# Patient Record
Sex: FEMALE | Race: WHITE | NOT HISPANIC OR LATINO | Employment: FULL TIME | URBAN - METROPOLITAN AREA
[De-identification: names, ages, dates, MRNs, and addresses within clinical notes are randomized per-mention and may not be internally consistent; named-entity substitution may affect disease eponyms.]

---

## 2024-02-02 ENCOUNTER — OFFICE VISIT (OUTPATIENT)
Dept: PRIMARY CARE | Facility: CLINIC | Age: 54
End: 2024-02-02
Payer: COMMERCIAL

## 2024-02-02 VITALS
DIASTOLIC BLOOD PRESSURE: 88 MMHG | OXYGEN SATURATION: 97 % | BODY MASS INDEX: 30.43 KG/M2 | HEIGHT: 60 IN | WEIGHT: 155 LBS | HEART RATE: 90 BPM | SYSTOLIC BLOOD PRESSURE: 150 MMHG

## 2024-02-02 DIAGNOSIS — Z72.0 TOBACCO USE: ICD-10-CM

## 2024-02-02 DIAGNOSIS — R59.1 LYMPHADENOPATHY: ICD-10-CM

## 2024-02-02 DIAGNOSIS — R03.0 ELEVATED BP WITHOUT DIAGNOSIS OF HYPERTENSION: ICD-10-CM

## 2024-02-02 DIAGNOSIS — Z76.89 ENCOUNTER TO ESTABLISH CARE: Primary | ICD-10-CM

## 2024-02-02 PROCEDURE — 99203 OFFICE O/P NEW LOW 30 MIN: CPT

## 2024-02-02 ASSESSMENT — ENCOUNTER SYMPTOMS
FATIGUE: 1
SORE THROAT: 0
FEVER: 0
SHORTNESS OF BREATH: 0
WHEEZING: 0
CHILLS: 0
SINUS PAIN: 1
COUGH: 0
RHINORRHEA: 1
SINUS PRESSURE: 1

## 2024-02-02 ASSESSMENT — PATIENT HEALTH QUESTIONNAIRE - PHQ9
SUM OF ALL RESPONSES TO PHQ9 QUESTIONS 1 AND 2: 0
2. FEELING DOWN, DEPRESSED OR HOPELESS: NOT AT ALL
1. LITTLE INTEREST OR PLEASURE IN DOING THINGS: NOT AT ALL

## 2024-02-02 ASSESSMENT — PAIN SCALES - GENERAL: PAINLEVEL: 0-NO PAIN

## 2024-02-02 NOTE — PATIENT INSTRUCTIONS
Start daily anti-histamine like Zyrtec nightly and nasal saline up to 4x a day. Monitor for swelling behind ear should resolve in the next 2-3 weeks.    Start recording home blood pressures 2-3 x a week, follow-up in one month to re-check blood pressure.

## 2024-02-02 NOTE — PROGRESS NOTES
Subjective   Patient ID: Pari Youngblood is a 53 y.o. female who presents for Establish Care (Pt is here to establish care and cocnerned for swelling behind rt ear for 1 week /la).    Hx tobacco use    Other providers: none, Previous PCP Renetta Rowland    Rt swelling behind ear x 1 week. Patient endorses increase in sinus drainage, especially at night when laying. +fatigue. Denies pain, changes in size, ear pain.          Review of Systems   Constitutional:  Positive for fatigue. Negative for chills and fever.   HENT:  Positive for postnasal drip, rhinorrhea, sinus pressure and sinus pain. Negative for congestion, ear discharge, ear pain and sore throat.    Respiratory:  Negative for cough, shortness of breath and wheezing.        Objective   /88   Pulse 90   Ht 1.524 m (5')   Wt 70.3 kg (155 lb)   SpO2 97%   BMI 30.27 kg/m²     Physical Exam  Constitutional:       Appearance: Normal appearance.   HENT:      Right Ear: Tympanic membrane and ear canal normal.      Left Ear: Tympanic membrane and ear canal normal.      Nose: No congestion or rhinorrhea.      Right Turbinates: Not enlarged, swollen or pale.      Left Turbinates: Not enlarged, swollen or pale.      Right Sinus: No maxillary sinus tenderness or frontal sinus tenderness.      Left Sinus: No maxillary sinus tenderness or frontal sinus tenderness.      Mouth/Throat:      Mouth: Mucous membranes are moist. No oral lesions.      Pharynx: Uvula midline. No oropharyngeal exudate or posterior oropharyngeal erythema.   Eyes:      Conjunctiva/sclera: Conjunctivae normal.   Cardiovascular:      Rate and Rhythm: Normal rate and regular rhythm.      Heart sounds: No murmur heard.  Pulmonary:      Effort: Pulmonary effort is normal.      Breath sounds: Normal breath sounds. No wheezing, rhonchi or rales.   Lymphadenopathy:      Cervical: No cervical adenopathy.   Skin:     General: Skin is warm and dry.   Neurological:      Mental Status: She is alert.    Psychiatric:         Mood and Affect: Mood and affect normal.         Assessment/Plan   Diagnoses and all orders for this visit:  Encounter to establish care  Elevated BP without diagnosis of hypertension  Lymphadenopathy  Tobacco use  Start daily anti-histamine like Zyrtec nightly and nasal saline up to 4x a day. Monitor for swelling behind ear should resolve in the next 2-3 weeks.    Start recording home blood pressures 2-3 x a week, follow-up in one month to re-check blood pressure.

## 2024-03-05 ENCOUNTER — APPOINTMENT (OUTPATIENT)
Dept: PRIMARY CARE | Facility: CLINIC | Age: 54
End: 2024-03-05
Payer: COMMERCIAL

## 2025-07-14 ENCOUNTER — OFFICE VISIT (OUTPATIENT)
Dept: URGENT CARE | Age: 55
End: 2025-07-14
Payer: MEDICAID

## 2025-07-14 VITALS
OXYGEN SATURATION: 99 % | RESPIRATION RATE: 18 BRPM | HEART RATE: 92 BPM | WEIGHT: 155 LBS | DIASTOLIC BLOOD PRESSURE: 98 MMHG | TEMPERATURE: 98.1 F | HEIGHT: 61 IN | SYSTOLIC BLOOD PRESSURE: 196 MMHG | BODY MASS INDEX: 29.27 KG/M2

## 2025-07-14 DIAGNOSIS — H92.01 OTALGIA, RIGHT: ICD-10-CM

## 2025-07-14 DIAGNOSIS — L30.0 NUMMULAR ECZEMA: Primary | ICD-10-CM

## 2025-07-14 RX ORDER — TRIAMCINOLONE ACETONIDE 1 MG/G
CREAM TOPICAL 2 TIMES DAILY
Qty: 30 G | Refills: 0 | Status: SHIPPED | OUTPATIENT
Start: 2025-07-14

## 2025-07-14 NOTE — PATIENT INSTRUCTIONS
Please follow up with your primary provider within one week if symptoms do not improve.  You may schedule an appointment online at [email or web address] or call [phone]. Go to the Emergency Department if symptoms significantly worsen or if you develop chest pain or shortness of breath.     Follow-up with your primary provider regarding your elevated blood pressure    I recommend Flonase

## 2025-07-14 NOTE — PROGRESS NOTES
"Subjective   Patient ID: Pari Youngblood.  54 y.o. female. They present today with a chief complaint of Rash (ON LOWER LEGS 2 MONTHS , ).    History of Present Illness  Endorses dry red circles that began to the inside of her left ankle 2 months ago, down the outside of her left ankle 1 month ago, then the front of the right ankle 2 weeks ago.  States she has had generalized bodyaches for the past 2 weeks.  States she is concerned that the symptoms could be related to Lyme disease.  No known tick bites, fever.      Follow-up with your primary provider regarding your elevated blood pressure also states her right ear has been intermittently aching for the past 3 days and is concerned she has an ear infection.  Endorses some nasal congestion and sinus pressure for the past 3 days, no sore throat, or cough.      Rash        Past Medical History  Allergies as of 07/14/2025    (No Known Allergies)       Prescriptions Prior to Admission[1]     Medical History[2]    Surgical History[3]     reports that she has been smoking cigarettes. She has never used smokeless tobacco. She reports that she does not drink alcohol and does not use drugs.    Review of Systems  Pertinent systems reviewed and were negative unless otherwise stated in HPI.    Objective    Vitals:    07/14/25 1145   BP: (!) 196/98   Pulse: 92   Resp: 18   Temp: 36.7 °C (98.1 °F)   TempSrc: Oral   SpO2: 99%   Weight: 70.3 kg (155 lb)   Height: (!) 1.549 m (5' 1\")     No LMP recorded.    Physical Exam  Constitutional:       General: She is not in acute distress.  HENT:      Right Ear: Tympanic membrane, ear canal and external ear normal.      Left Ear: Tympanic membrane, ear canal and external ear normal.      Nose: Nose normal.   Cardiovascular:      Rate and Rhythm: Normal rate.   Pulmonary:      Effort: Pulmonary effort is normal.   Skin:     Findings: Rash (1cm erythematous dry scaly macules to bilateral distal lower left leg, anterior right lower leg) " present.         Diagnostic study results (if any) were reviewed by Migue Mckinney PA-C.    Assessment/Plan   Allergies, medications, history, and pertinent labs/EKGs/imaging reviewed by Migue Mckinney PA-C.     Medical Decision Making  Low concern for Lyme disease. Elevated blood pressure due to patient stopping her antihypertensives.  rash is more consistent with nummular eczema given there is no raised scaly borders as seen in tinea.  The low concern for hypertensive emergency.      Orders and Diagnoses  Diagnoses and all orders for this visit:  Nummular eczema  -     triamcinolone (Kenalog) 0.1 % cream; Apply topically 2 times a day.  Otalgia, right      Medical Admin Record      Disposition: Home    Electronically signed by Migue Mckinney PA-C       [1] (Not in a hospital admission)   [2] History reviewed. No pertinent past medical history.  [3] History reviewed. No pertinent surgical history.

## 2025-07-15 ENCOUNTER — TELEPHONE (OUTPATIENT)
Dept: URGENT CARE | Age: 55
End: 2025-07-15